# Patient Record
Sex: MALE | Race: WHITE | NOT HISPANIC OR LATINO | URBAN - METROPOLITAN AREA
[De-identification: names, ages, dates, MRNs, and addresses within clinical notes are randomized per-mention and may not be internally consistent; named-entity substitution may affect disease eponyms.]

---

## 2023-04-02 ENCOUNTER — EMERGENCY (EMERGENCY)
Facility: HOSPITAL | Age: 23
LOS: 1 days | Discharge: HOME | End: 2023-04-02
Admitting: STUDENT IN AN ORGANIZED HEALTH CARE EDUCATION/TRAINING PROGRAM
Payer: COMMERCIAL

## 2023-04-02 VITALS
TEMPERATURE: 98 F | OXYGEN SATURATION: 98 % | HEART RATE: 76 BPM | WEIGHT: 149.91 LBS | HEIGHT: 74 IN | RESPIRATION RATE: 17 BRPM | SYSTOLIC BLOOD PRESSURE: 134 MMHG | DIASTOLIC BLOOD PRESSURE: 82 MMHG

## 2023-04-02 DIAGNOSIS — S01.81XA LACERATION WITHOUT FOREIGN BODY OF OTHER PART OF HEAD, INITIAL ENCOUNTER: ICD-10-CM
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
## 2023-04-02 PROCEDURE — 82962 GLUCOSE BLOOD TEST: CPT

## 2023-04-02 PROCEDURE — 99283 EMERGENCY DEPT VISIT LOW MDM: CPT | Mod: 25

## 2023-04-02 PROCEDURE — 12011 RPR F/E/E/N/L/M 2.5 CM/<: CPT

## 2023-04-02 PROCEDURE — 99284 EMERGENCY DEPT VISIT MOD MDM: CPT | Mod: 25

## 2023-04-02 RX ORDER — LIDOCAINE HYDROCHLORIDE AND EPINEPHRINE 10; 10 MG/ML; UG/ML
10 INJECTION, SOLUTION INFILTRATION; PERINEURAL ONCE
Refills: 0 | Status: COMPLETED | OUTPATIENT
Start: 2023-04-02 | End: 2023-04-02

## 2023-04-02 RX ADMIN — LIDOCAINE HYDROCHLORIDE AND EPINEPHRINE 10 MILLILITER(S): 10; 10 INJECTION, SOLUTION INFILTRATION; PERINEURAL at 04:13

## 2023-04-02 NOTE — ED PROVIDER NOTE - PHYSICAL EXAMINATION
Constitutional : Well appearing, non-toxic, no acute distress. awake, alert, oriented to person, place, time/situation.  Head : head normocephalic, atraumatic. 2cm laceration to chin. bleeding controlled.   EENMT : eyes clear bilaterally, PERRL, EOMI. airway patent. moist mucous membranes. neck supple.  Cardiac : Extremities warm and well perfused. 2+ radial and DP pulses. cap refill <2 seconds. no LE edema.  Resp : Respirations even and unlabored.   MSK :  range of motion is not limited, no muscle or joint tenderness  Back : No evidence of trauma. No spinal or CVA tenderness.  Neuro : Alert and oriented, CNII-XII grossly intact, no focal deficits, no motor or sensory deficits.  Skin : Skin normal color for race, warm, dry and intact. No evidence of rash.  Psych : Alert and oriented to person, place, time/situation. normal mood and affect. no apparent risk to self or others.

## 2023-04-02 NOTE — ED ADULT NURSE NOTE - OBJECTIVE STATEMENT
alert and orientedx4. Presenting to the ed chin laceration, alcohol on board, s/p fall on curb where he sustained laceration to chin. Mild bleeding noted to site/controlled with gauze dressing. Patient denies nausea/vomiting/passing out. No tremors/sweating/anxiety noted. Patient denies pain.

## 2023-04-02 NOTE — ED PROVIDER NOTE - CLINICAL SUMMARY MEDICAL DECISION MAKING FREE TEXT BOX
otherwise healthy, here w chin laceration after mechanical trip and fall. hit chin on ground, no loc. +etoh. denies other injuries.   pt well appearing, stable vitals, 2cm non-gaping laceration to chin, no bleeding. exam otherwise unremarkable.   no indication for head imaging. tetanus previously utd.  wound care / laceration repair.     Discharge plan and return precautions d/w pt who verbalized understanding and agrees with plan. All questions answered. Vitals WNL. Ready for d/c.

## 2023-04-02 NOTE — ED PROVIDER NOTE - OBJECTIVE STATEMENT
22 yr old male, otherwise healthy, presents to the Emergency Department with chin laceration. pt was walking and tripped and fell. landed hitting chin on ground. no LOC. no ac/asa use. has cut to bottom of his chin. no pain w opening / closing jaw. tetanus utd.  admits to etoh tonight  denies other injuries. 22 yr old male, otherwise healthy, presents to the Emergency Department with chin laceration. pt was walking and tripped and fell. landed hitting chin on ground. no LOC. no ac/asa use. has cut to bottom of his chin. no pain w opening / closing jaw. tetanus utd.  admits to etoh tonight. denies other injuries.

## 2023-04-02 NOTE — ED PROVIDER NOTE - PATIENT PORTAL LINK FT
You can access the FollowMyHealth Patient Portal offered by [facility] by registering at the following website: [email or web address]. By joining [facility]’s FollowMyHealth portal, you will also be able to view your health information using other applications (apps) compatible with our system.

## 2023-04-02 NOTE — ED PROVIDER NOTE - NSFOLLOWUPINSTRUCTIONS_ED_ALL_ED_FT
You have FOUR sutures.     Keep the area dry for 24 hours. After 24 hours you may get the area wet and clean it as you normally would (showering, washing hands, etc), but do not submerge in water until sutures have been removed (going swimming or taking a bath, etc).   Try to keep the area relatively clean - wash area with mild soap and water at least 2-3 times a day and then you may apply a light layer of bacitracin 1-2 times a day to the cleaned area, but be sure not to apply to much, or too often.   Return in 5-7 days for the removal of your sutures.   Return to the Emergency Department if the area becomes red, warm, swollen or painful, if you notice redness traveling up towards your body from the site of the laceration, or if you develop a fever/chills, or any other serious concerns.    SCAR HEALING - Remember, that after all lacerations such as this you will have a scar that forms. With wound repair, the scar is less than if it were left to heal on its own. After the sutures have been removed and the wound continues to heal over the next 4-6 months, that area of the skin will be very sensitive to sun and sun exposure can worsen scarring to the area - be sure to use sun protection accordingly to avoid any complications.

## 2023-04-02 NOTE — ED ADULT NURSE NOTE - CHIEF COMPLAINT QUOTE
Pt presents with laceration to chin, approx 2 cm, bleeding controlled, tripped and fell onto sidewalk, +ETOH. Td UTD per pt.